# Patient Record
Sex: MALE | Race: WHITE | URBAN - METROPOLITAN AREA
[De-identification: names, ages, dates, MRNs, and addresses within clinical notes are randomized per-mention and may not be internally consistent; named-entity substitution may affect disease eponyms.]

---

## 2023-01-20 ENCOUNTER — HOSPITAL ENCOUNTER (OUTPATIENT)
Dept: LAB | Age: 43
Discharge: HOME | End: 2023-01-20

## 2023-01-20 ENCOUNTER — OFFICE VISIT (OUTPATIENT)
Dept: FAMILY MEDICINE CLINIC | Age: 43
End: 2023-01-20
Payer: COMMERCIAL

## 2023-01-20 VITALS
WEIGHT: 216.4 LBS | OXYGEN SATURATION: 99 % | BODY MASS INDEX: 30.3 KG/M2 | SYSTOLIC BLOOD PRESSURE: 172 MMHG | HEIGHT: 71 IN | DIASTOLIC BLOOD PRESSURE: 108 MMHG | HEART RATE: 94 BPM | TEMPERATURE: 97.7 F

## 2023-01-20 DIAGNOSIS — F11.21 OPIOID USE DISORDER, MODERATE, IN SUSTAINED REMISSION (HCC): ICD-10-CM

## 2023-01-20 DIAGNOSIS — Z11.59 ENCOUNTER FOR HEPATITIS C SCREENING TEST FOR LOW RISK PATIENT: ICD-10-CM

## 2023-01-20 DIAGNOSIS — R00.2 PALPITATIONS: ICD-10-CM

## 2023-01-20 DIAGNOSIS — R10.9 STOMACH PAIN: ICD-10-CM

## 2023-01-20 DIAGNOSIS — I10 PRIMARY HYPERTENSION: Primary | ICD-10-CM

## 2023-01-20 DIAGNOSIS — E66.09 CLASS 1 OBESITY DUE TO EXCESS CALORIES WITHOUT SERIOUS COMORBIDITY WITH BODY MASS INDEX (BMI) OF 30.0 TO 30.9 IN ADULT: ICD-10-CM

## 2023-01-20 PROBLEM — L40.9 PSORIASIS: Status: ACTIVE | Noted: 2023-01-20

## 2023-01-20 LAB — XX-LABCORP SPECIMEN COL,LCBCF: NORMAL

## 2023-01-20 PROCEDURE — 99204 OFFICE O/P NEW MOD 45 MIN: CPT | Performed by: STUDENT IN AN ORGANIZED HEALTH CARE EDUCATION/TRAINING PROGRAM

## 2023-01-20 PROCEDURE — 99001 SPECIMEN HANDLING PT-LAB: CPT

## 2023-01-20 PROCEDURE — 3078F DIAST BP <80 MM HG: CPT | Performed by: STUDENT IN AN ORGANIZED HEALTH CARE EDUCATION/TRAINING PROGRAM

## 2023-01-20 PROCEDURE — 3074F SYST BP LT 130 MM HG: CPT | Performed by: STUDENT IN AN ORGANIZED HEALTH CARE EDUCATION/TRAINING PROGRAM

## 2023-01-20 RX ORDER — BUPRENORPHINE AND NALOXONE 8; 2 MG/1; MG/1
FILM, SOLUBLE BUCCAL; SUBLINGUAL
COMMUNITY
Start: 2022-12-21

## 2023-01-20 RX ORDER — DILTIAZEM HYDROCHLORIDE 120 MG/1
120 CAPSULE, EXTENDED RELEASE ORAL DAILY
Qty: 30 CAPSULE | Refills: 2 | Status: SHIPPED | OUTPATIENT
Start: 2023-01-20

## 2023-01-20 NOTE — PROGRESS NOTES
Kahlil Broderick (: 1980) is a 38 y.o. male, new patient, here for evaluation of the following chief complaint(s):  Establish Care       Assessment/Plan:  1. Primary hypertension-moderately elevated today. Previously only on metoprolol with minimal response and side effects. We will try diltiazem for treatment of blood pressure and palpitations. Titrate dose based on response. Follow-up in 1 month.  -     METABOLIC PANEL, COMPREHENSIVE; Future  -     CBC W/O DIFF; Future  -     dilTIAZem ER (DILACOR XR) 120 mg capsule; Take 1 Capsule by mouth daily. , Normal, Disp-30 Capsule, R-2  2. Palpitations-patient reports Holter monitor twice with normal findings. We will try diltiazem instead of a beta-blocker to help control symptoms. -     dilTIAZem ER (DILACOR XR) 120 mg capsule; Take 1 Capsule by mouth daily. , Normal, Disp-30 Capsule, R-2  3. Class 1 obesity due to excess calories without serious comorbidity with body mass index (BMI) of 30.0 to 30.9 in adult-diet and lifestyle modifications for weight loss. Dietary principles from MyPlate.gov for a healthy, varied diet. Reduce caloric intake. Recommend cumulatively 150-minutes of moderate intensity exercise or 75-minutes of vigorous activity weekly. -     LIPID PANEL; Future  -     HEMOGLOBIN A1C WITH EAG; Future  4. Opioid use disorder, moderate, in sustained remission (HCC)-continue follow-up with Suboxone clinic. 5. Stomach pain-symptom diary. Cut out dairy for the next 2 weeks. Monitor symptoms. After that supplement with fiber for 2 weeks and follow-up with me. Given information on higher fiber diet. 6. Encounter for hepatitis C screening test for low risk patient  -     HEPATITIS C AB; Future      Return in about 1 month (around 2023) for GI concerns. - Discuss with pt naloxone prescription in house. Subjective/Objective:  HPI  [address] Previously [employer]. Stomach pain- Going on for about a year. Dairy seem to make it worse. Greasy foods causes need for BM shortly thereafter and stomach hurting. Denies melena, weight loss. Meds attempted: fiber supplement. Palpitations- Wore monitor twice. Reportedly normal. Through the [place] in [place]. HTN- Usually high 150s/90s. Last was on Metoprolol 50mg about 3 months ago. Opioid Use Disorder- Jeremy Gambino- Suboxone. Been on for the last 4 years. 2007 L shoulder surgery- was prescribed Ultram which led to dependence of prescription opioids. Denies every using IV opioids. Physical Exam  Blood pressure (!) 172/108, pulse 94, temperature 97.7 °F (36.5 °C), temperature source Tympanic, height 5' 10.5\" (1.791 m), weight 216 lb 6.4 oz (98.2 kg), SpO2 99 %. Body mass index is 30.61 kg/m². Physical Exam      Medical History- Reviewed Social History- Reviewed Surgical History- Reviewed   Marianne Calle has a past medical history of ADD (attention deficit disorder) without hyperactivity, Baker's cyst of knee, left, and Hypertension. Marianne Calle reports that he has never smoked. He has never used smokeless tobacco. He reports that he does not drink alcohol and does not use drugs. Marianne Calle has a past surgical history that includes hx orthopaedic (2006) and hx vasectomy (2012). Problem List- Reviewed   Marianne Calle has Hypertension, ADD (attention deficit disorder) without hyperactivity, Psoriasis, Opioid use disorder, moderate, in sustained remission (HCC), Palpitations, Class 1 obesity due to excess calories without serious comorbidity with body mass index (BMI) of 30.0 to 30.9 in adult, and Stomach pain on their problem list.       Current Outpatient Medications   Medication Instructions    buprenorphine-naloxone (SUBOXONE) 8-2 mg film sublingaul film PLACE 1 FILM UNDER THE TONGUE TWICE DAILY    dilTIAZem ER (DILACOR XR) 120 mg, Oral, DAILY           An electronic signature was used to authenticate this note.   -- Indu Levin MD

## 2023-01-20 NOTE — PATIENT INSTRUCTIONS
Cut out dairy for the next 2 weeks  Keep food/symptom diary. Then after 2 weeks fiber supplement 2 tabs, twice a day  Work on improving fiber content in diet now.

## 2023-01-20 NOTE — PROGRESS NOTES
1. \"Have you been to the ER, urgent care clinic since your last visit? Hospitalized since your last visit? \" No    2. \"Have you seen or consulted any other health care providers outside of the [address] [place] [address] since your last visit? \" No     3. For patients aged 39-70: Has the patient had a colonoscopy / FIT/ Cologuard? Yes - no Care Gap present      If the patient is female:    4. For patients aged 41-77: Has the patient had a mammogram within the past 2 years? NA - based on age or sex      11. For patients aged 21-65: Has the patient had a pap smear?  NA - based on age or sex

## 2023-01-21 LAB
ALBUMIN SERPL-MCNC: 4.5 G/DL (ref 4–5)
ALBUMIN/GLOB SERPL: 1.6 {RATIO} (ref 1.2–2.2)
ALP SERPL-CCNC: 118 IU/L (ref 44–121)
ALT SERPL-CCNC: 13 IU/L (ref 0–44)
AST SERPL-CCNC: 22 IU/L (ref 0–40)
BILIRUB SERPL-MCNC: 0.4 MG/DL (ref 0–1.2)
BUN SERPL-MCNC: 12 MG/DL (ref 6–24)
BUN/CREAT SERPL: 13 (ref 9–20)
CALCIUM SERPL-MCNC: 9.3 MG/DL (ref 8.7–10.2)
CHLORIDE SERPL-SCNC: 103 MMOL/L (ref 96–106)
CHOLEST SERPL-MCNC: 195 MG/DL (ref 100–199)
CO2 SERPL-SCNC: 26 MMOL/L (ref 20–29)
CREAT SERPL-MCNC: 0.89 MG/DL (ref 0.76–1.27)
EGFR: 111 ML/MIN/1.73
ERYTHROCYTE [DISTWIDTH] IN BLOOD BY AUTOMATED COUNT: 12.7 % (ref 11.6–15.4)
EST. AVERAGE GLUCOSE BLD GHB EST-MCNC: 114 MG/DL
GLOBULIN SER CALC-MCNC: 2.9 G/DL (ref 1.5–4.5)
GLUCOSE SERPL-MCNC: 101 MG/DL (ref 70–99)
HBA1C MFR BLD: 5.6 % (ref 4.8–5.6)
HCT VFR BLD AUTO: 41.3 % (ref 37.5–51)
HCV AB S/CO SERPL IA: <0.1 S/CO RATIO (ref 0–0.9)
HDLC SERPL-MCNC: 44 MG/DL
HGB BLD-MCNC: 13.9 G/DL (ref 13–17.7)
IMP & REVIEW OF LAB RESULTS: NORMAL
LDLC SERPL CALC-MCNC: 95 MG/DL (ref 0–99)
MCH RBC QN AUTO: 30.5 PG (ref 26.6–33)
MCHC RBC AUTO-ENTMCNC: 33.7 G/DL (ref 31.5–35.7)
MCV RBC AUTO: 91 FL (ref 79–97)
PLATELET # BLD AUTO: 274 X10E3/UL (ref 150–450)
POTASSIUM SERPL-SCNC: 4.1 MMOL/L (ref 3.5–5.2)
PROT SERPL-MCNC: 7.4 G/DL (ref 6–8.5)
RBC # BLD AUTO: 4.55 X10E6/UL (ref 4.14–5.8)
SODIUM SERPL-SCNC: 141 MMOL/L (ref 134–144)
TRIGL SERPL-MCNC: 333 MG/DL (ref 0–149)
VLDLC SERPL CALC-MCNC: 56 MG/DL (ref 5–40)
WBC # BLD AUTO: 8.2 X10E3/UL (ref 3.4–10.8)

## 2023-01-23 PROBLEM — R10.9 STOMACH PAIN: Status: ACTIVE | Noted: 2023-01-23
